# Patient Record
Sex: FEMALE | Race: WHITE | NOT HISPANIC OR LATINO | URBAN - METROPOLITAN AREA
[De-identification: names, ages, dates, MRNs, and addresses within clinical notes are randomized per-mention and may not be internally consistent; named-entity substitution may affect disease eponyms.]

---

## 2017-11-15 ENCOUNTER — EMERGENCY (EMERGENCY)
Facility: HOSPITAL | Age: 82
LOS: 1 days | Discharge: HOME | End: 2017-11-15
Attending: EMERGENCY MEDICINE
Payer: MEDICARE

## 2017-11-15 VITALS
HEART RATE: 79 BPM | SYSTOLIC BLOOD PRESSURE: 145 MMHG | DIASTOLIC BLOOD PRESSURE: 101 MMHG | OXYGEN SATURATION: 98 % | RESPIRATION RATE: 17 BRPM | WEIGHT: 130.07 LBS | TEMPERATURE: 98 F

## 2017-11-15 DIAGNOSIS — R11.2 NAUSEA WITH VOMITING, UNSPECIFIED: ICD-10-CM

## 2017-11-15 DIAGNOSIS — F03.90 UNSPECIFIED DEMENTIA WITHOUT BEHAVIORAL DISTURBANCE: ICD-10-CM

## 2017-11-15 DIAGNOSIS — E78.5 HYPERLIPIDEMIA, UNSPECIFIED: ICD-10-CM

## 2017-11-15 DIAGNOSIS — I10 ESSENTIAL (PRIMARY) HYPERTENSION: ICD-10-CM

## 2017-11-15 LAB
ALBUMIN SERPL ELPH-MCNC: 3.5 G/DL — SIGNIFICANT CHANGE UP (ref 3.5–5)
ALP SERPL-CCNC: 45 U/L — SIGNIFICANT CHANGE UP (ref 40–120)
ALT FLD-CCNC: 47 U/L DA — SIGNIFICANT CHANGE UP (ref 10–60)
ANION GAP SERPL CALC-SCNC: 11 MMOL/L — SIGNIFICANT CHANGE UP (ref 5–17)
APPEARANCE UR: CLEAR — SIGNIFICANT CHANGE UP
APTT BLD: 23.3 SEC — LOW (ref 27.5–37.4)
AST SERPL-CCNC: 38 U/L — SIGNIFICANT CHANGE UP (ref 10–40)
BASOPHILS # BLD AUTO: 0.1 K/UL — SIGNIFICANT CHANGE UP (ref 0–0.2)
BASOPHILS NFR BLD AUTO: 1.2 % — SIGNIFICANT CHANGE UP (ref 0–2)
BILIRUB SERPL-MCNC: 0.3 MG/DL — SIGNIFICANT CHANGE UP (ref 0.2–1.2)
BILIRUB UR-MCNC: NEGATIVE — SIGNIFICANT CHANGE UP
BUN SERPL-MCNC: 17 MG/DL — SIGNIFICANT CHANGE UP (ref 7–18)
CALCIUM SERPL-MCNC: 8.9 MG/DL — SIGNIFICANT CHANGE UP (ref 8.4–10.5)
CHLORIDE SERPL-SCNC: 101 MMOL/L — SIGNIFICANT CHANGE UP (ref 96–108)
CK MB CFR SERPL CALC: 1.1 NG/ML — SIGNIFICANT CHANGE UP (ref 0–3.6)
CO2 SERPL-SCNC: 25 MMOL/L — SIGNIFICANT CHANGE UP (ref 22–31)
COLOR SPEC: YELLOW — SIGNIFICANT CHANGE UP
CREAT SERPL-MCNC: 0.78 MG/DL — SIGNIFICANT CHANGE UP (ref 0.5–1.3)
DIFF PNL FLD: NEGATIVE — SIGNIFICANT CHANGE UP
EOSINOPHIL # BLD AUTO: 0.2 K/UL — SIGNIFICANT CHANGE UP (ref 0–0.5)
EOSINOPHIL NFR BLD AUTO: 2.8 % — SIGNIFICANT CHANGE UP (ref 0–6)
GLUCOSE SERPL-MCNC: 194 MG/DL — HIGH (ref 70–99)
GLUCOSE UR QL: NEGATIVE — SIGNIFICANT CHANGE UP
HCT VFR BLD CALC: 36.3 % — SIGNIFICANT CHANGE UP (ref 34.5–45)
HGB BLD-MCNC: 12 G/DL — SIGNIFICANT CHANGE UP (ref 11.5–15.5)
INR BLD: 1.01 RATIO — SIGNIFICANT CHANGE UP (ref 0.88–1.16)
KETONES UR-MCNC: ABNORMAL
LACTATE SERPL-SCNC: 3.5 MMOL/L — HIGH (ref 0.7–2)
LEUKOCYTE ESTERASE UR-ACNC: NEGATIVE — SIGNIFICANT CHANGE UP
LIDOCAIN IGE QN: 284 U/L — SIGNIFICANT CHANGE UP (ref 73–393)
LYMPHOCYTES # BLD AUTO: 1.4 K/UL — SIGNIFICANT CHANGE UP (ref 1–3.3)
LYMPHOCYTES # BLD AUTO: 21.1 % — SIGNIFICANT CHANGE UP (ref 13–44)
MAGNESIUM SERPL-MCNC: 1.8 MG/DL — SIGNIFICANT CHANGE UP (ref 1.6–2.6)
MCHC RBC-ENTMCNC: 30.1 PG — SIGNIFICANT CHANGE UP (ref 27–34)
MCHC RBC-ENTMCNC: 33.2 GM/DL — SIGNIFICANT CHANGE UP (ref 32–36)
MCV RBC AUTO: 90.8 FL — SIGNIFICANT CHANGE UP (ref 80–100)
MONOCYTES # BLD AUTO: 0.5 K/UL — SIGNIFICANT CHANGE UP (ref 0–0.9)
MONOCYTES NFR BLD AUTO: 7.8 % — SIGNIFICANT CHANGE UP (ref 2–14)
NEUTROPHILS # BLD AUTO: 4.3 K/UL — SIGNIFICANT CHANGE UP (ref 1.8–7.4)
NEUTROPHILS NFR BLD AUTO: 67.1 % — SIGNIFICANT CHANGE UP (ref 43–77)
NITRITE UR-MCNC: NEGATIVE — SIGNIFICANT CHANGE UP
NT-PROBNP SERPL-SCNC: 204 PG/ML — SIGNIFICANT CHANGE UP (ref 0–450)
PH UR: 9 — HIGH (ref 5–8)
PLATELET # BLD AUTO: 185 K/UL — SIGNIFICANT CHANGE UP (ref 150–400)
POTASSIUM SERPL-MCNC: 4 MMOL/L — SIGNIFICANT CHANGE UP (ref 3.5–5.3)
POTASSIUM SERPL-SCNC: 4 MMOL/L — SIGNIFICANT CHANGE UP (ref 3.5–5.3)
PROT SERPL-MCNC: 7.4 G/DL — SIGNIFICANT CHANGE UP (ref 6–8.3)
PROT UR-MCNC: 15
PROTHROM AB SERPL-ACNC: 11 SEC — SIGNIFICANT CHANGE UP (ref 9.8–12.7)
RBC # BLD: 4 M/UL — SIGNIFICANT CHANGE UP (ref 3.8–5.2)
RBC # FLD: 12.2 % — SIGNIFICANT CHANGE UP (ref 10.3–14.5)
SODIUM SERPL-SCNC: 137 MMOL/L — SIGNIFICANT CHANGE UP (ref 135–145)
SP GR SPEC: 1.01 — SIGNIFICANT CHANGE UP (ref 1.01–1.02)
TROPONIN I SERPL-MCNC: <0.015 NG/ML — SIGNIFICANT CHANGE UP (ref 0–0.04)
UROBILINOGEN FLD QL: NEGATIVE — SIGNIFICANT CHANGE UP
WBC # BLD: 6.5 K/UL — SIGNIFICANT CHANGE UP (ref 3.8–10.5)
WBC # FLD AUTO: 6.5 K/UL — SIGNIFICANT CHANGE UP (ref 3.8–10.5)

## 2017-11-15 PROCEDURE — 82962 GLUCOSE BLOOD TEST: CPT

## 2017-11-15 PROCEDURE — 70450 CT HEAD/BRAIN W/O DYE: CPT

## 2017-11-15 PROCEDURE — 82553 CREATINE MB FRACTION: CPT

## 2017-11-15 PROCEDURE — 84484 ASSAY OF TROPONIN QUANT: CPT

## 2017-11-15 PROCEDURE — 71045 X-RAY EXAM CHEST 1 VIEW: CPT

## 2017-11-15 PROCEDURE — 71260 CT THORAX DX C+: CPT

## 2017-11-15 PROCEDURE — 70450 CT HEAD/BRAIN W/O DYE: CPT | Mod: 26

## 2017-11-15 PROCEDURE — 87040 BLOOD CULTURE FOR BACTERIA: CPT

## 2017-11-15 PROCEDURE — 71010: CPT | Mod: 26

## 2017-11-15 PROCEDURE — 85610 PROTHROMBIN TIME: CPT

## 2017-11-15 PROCEDURE — 99284 EMERGENCY DEPT VISIT MOD MDM: CPT | Mod: 25

## 2017-11-15 PROCEDURE — 71260 CT THORAX DX C+: CPT | Mod: 26

## 2017-11-15 PROCEDURE — 85027 COMPLETE CBC AUTOMATED: CPT

## 2017-11-15 PROCEDURE — 96374 THER/PROPH/DIAG INJ IV PUSH: CPT

## 2017-11-15 PROCEDURE — 74177 CT ABD & PELVIS W/CONTRAST: CPT

## 2017-11-15 PROCEDURE — 99284 EMERGENCY DEPT VISIT MOD MDM: CPT

## 2017-11-15 PROCEDURE — 83735 ASSAY OF MAGNESIUM: CPT

## 2017-11-15 PROCEDURE — 80053 COMPREHEN METABOLIC PANEL: CPT

## 2017-11-15 PROCEDURE — 81001 URINALYSIS AUTO W/SCOPE: CPT

## 2017-11-15 PROCEDURE — 83690 ASSAY OF LIPASE: CPT

## 2017-11-15 PROCEDURE — 83605 ASSAY OF LACTIC ACID: CPT

## 2017-11-15 PROCEDURE — 83880 ASSAY OF NATRIURETIC PEPTIDE: CPT

## 2017-11-15 PROCEDURE — 87086 URINE CULTURE/COLONY COUNT: CPT

## 2017-11-15 PROCEDURE — 85730 THROMBOPLASTIN TIME PARTIAL: CPT

## 2017-11-15 PROCEDURE — 74177 CT ABD & PELVIS W/CONTRAST: CPT | Mod: 26

## 2017-11-15 RX ORDER — MIDAZOLAM HYDROCHLORIDE 1 MG/ML
2 INJECTION, SOLUTION INTRAMUSCULAR; INTRAVENOUS ONCE
Refills: 0 | Status: DISCONTINUED | OUTPATIENT
Start: 2017-11-15 | End: 2017-11-15

## 2017-11-15 RX ORDER — ONDANSETRON 8 MG/1
5 TABLET, FILM COATED ORAL
Qty: 75 | Refills: 0
Start: 2017-11-15 | End: 2017-11-20

## 2017-11-15 RX ORDER — SODIUM CHLORIDE 9 MG/ML
1000 INJECTION INTRAMUSCULAR; INTRAVENOUS; SUBCUTANEOUS ONCE
Refills: 0 | Status: COMPLETED | OUTPATIENT
Start: 2017-11-15 | End: 2017-11-15

## 2017-11-15 RX ORDER — ONDANSETRON 8 MG/1
4 TABLET, FILM COATED ORAL ONCE
Refills: 0 | Status: COMPLETED | OUTPATIENT
Start: 2017-11-15 | End: 2017-11-15

## 2017-11-15 RX ORDER — HALOPERIDOL DECANOATE 100 MG/ML
5 INJECTION INTRAMUSCULAR ONCE
Refills: 0 | Status: COMPLETED | OUTPATIENT
Start: 2017-11-15 | End: 2017-11-15

## 2017-11-15 RX ADMIN — ONDANSETRON 4 MILLIGRAM(S): 8 TABLET, FILM COATED ORAL at 18:52

## 2017-11-15 RX ADMIN — SODIUM CHLORIDE 1000 MILLILITER(S): 9 INJECTION INTRAMUSCULAR; INTRAVENOUS; SUBCUTANEOUS at 18:53

## 2017-11-15 NOTE — ED ADULT NURSE NOTE - OBJECTIVE STATEMENT
patient came to the ED for vomiting and weakness. Patient is alert and confused has poor appetite, agitated during care.

## 2017-11-15 NOTE — ED PROVIDER NOTE - PHYSICAL EXAMINATION
Gen: somnolent but arousable. looks anxious.   Head: NC, AT   Eyes: PERRL, EOMI, normal lids/conjunctiva  ENT: normal hearing, patent oropharynx without erythema/exudate, uvula midline  Neck: supple, no tenderness, Trachea midline  Pulm: Bilateral BS, normal resp effort, no wheeze/stridor/retractions  CV: RRR, no M/R/G, 2+ radial and dp pulses bl, no edema  Abd: soft, NT/ND, +BS, no hepatosplenomegaly  Mskel: extremities x4 with normal ROM and no joint effusions. no ctl spine ttp.   Skin: no rash, no bruising   Neuro: patient seems to know her daughter, but does not understand where, when or what is happening now. no sensory/motor deficits, CN 2-12 intact

## 2017-11-16 LAB
CULTURE RESULTS: NO GROWTH — SIGNIFICANT CHANGE UP
SPECIMEN SOURCE: SIGNIFICANT CHANGE UP

## 2017-11-21 LAB
CULTURE RESULTS: SIGNIFICANT CHANGE UP
CULTURE RESULTS: SIGNIFICANT CHANGE UP
SPECIMEN SOURCE: SIGNIFICANT CHANGE UP
SPECIMEN SOURCE: SIGNIFICANT CHANGE UP

## 2018-06-04 ENCOUNTER — INPATIENT (INPATIENT)
Facility: HOSPITAL | Age: 83
LOS: 0 days | Discharge: OTHER INSTITUTION | End: 2018-06-05
Attending: INTERNAL MEDICINE | Admitting: INTERNAL MEDICINE
Payer: MEDICARE

## 2018-06-04 VITALS
SYSTOLIC BLOOD PRESSURE: 136 MMHG | DIASTOLIC BLOOD PRESSURE: 82 MMHG | RESPIRATION RATE: 18 BRPM | TEMPERATURE: 98 F | OXYGEN SATURATION: 96 % | HEART RATE: 93 BPM

## 2018-06-04 DIAGNOSIS — G30.8 OTHER ALZHEIMER'S DISEASE: ICD-10-CM

## 2018-06-04 DIAGNOSIS — R62.7 ADULT FAILURE TO THRIVE: ICD-10-CM

## 2018-06-04 DIAGNOSIS — R19.7 DIARRHEA, UNSPECIFIED: ICD-10-CM

## 2018-06-04 DIAGNOSIS — I10 ESSENTIAL (PRIMARY) HYPERTENSION: ICD-10-CM

## 2018-06-04 DIAGNOSIS — R53.2 FUNCTIONAL QUADRIPLEGIA: ICD-10-CM

## 2018-06-04 DIAGNOSIS — Z29.9 ENCOUNTER FOR PROPHYLACTIC MEASURES, UNSPECIFIED: ICD-10-CM

## 2018-06-04 DIAGNOSIS — E11.9 TYPE 2 DIABETES MELLITUS WITHOUT COMPLICATIONS: ICD-10-CM

## 2018-06-04 DIAGNOSIS — Z71.89 OTHER SPECIFIED COUNSELING: ICD-10-CM

## 2018-06-04 LAB
ALBUMIN SERPL ELPH-MCNC: 3.2 G/DL — LOW (ref 3.3–5)
ALP SERPL-CCNC: 46 U/L — SIGNIFICANT CHANGE UP (ref 40–120)
ALT FLD-CCNC: 28 U/L — SIGNIFICANT CHANGE UP (ref 4–33)
APPEARANCE UR: CLEAR — SIGNIFICANT CHANGE UP
AST SERPL-CCNC: 37 U/L — HIGH (ref 4–32)
BASE EXCESS BLDV CALC-SCNC: 2.7 MMOL/L — SIGNIFICANT CHANGE UP
BASOPHILS # BLD AUTO: 0.03 K/UL — SIGNIFICANT CHANGE UP (ref 0–0.2)
BASOPHILS NFR BLD AUTO: 0.3 % — SIGNIFICANT CHANGE UP (ref 0–2)
BILIRUB SERPL-MCNC: 0.3 MG/DL — SIGNIFICANT CHANGE UP (ref 0.2–1.2)
BILIRUB UR-MCNC: NEGATIVE — SIGNIFICANT CHANGE UP
BLOOD GAS VENOUS - CREATININE: 0.49 MG/DL — LOW (ref 0.5–1.3)
BLOOD UR QL VISUAL: NEGATIVE — SIGNIFICANT CHANGE UP
BUN SERPL-MCNC: 16 MG/DL — SIGNIFICANT CHANGE UP (ref 7–23)
CALCIUM SERPL-MCNC: 11 MG/DL — HIGH (ref 8.4–10.5)
CHLORIDE BLDV-SCNC: 100 MMOL/L — SIGNIFICANT CHANGE UP (ref 96–108)
CHLORIDE SERPL-SCNC: 93 MMOL/L — LOW (ref 98–107)
CO2 SERPL-SCNC: 25 MMOL/L — SIGNIFICANT CHANGE UP (ref 22–31)
COLOR SPEC: YELLOW — SIGNIFICANT CHANGE UP
CREAT SERPL-MCNC: 0.58 MG/DL — SIGNIFICANT CHANGE UP (ref 0.5–1.3)
EOSINOPHIL # BLD AUTO: 0.09 K/UL — SIGNIFICANT CHANGE UP (ref 0–0.5)
EOSINOPHIL NFR BLD AUTO: 0.9 % — SIGNIFICANT CHANGE UP (ref 0–6)
GAS PNL BLDV: 129 MMOL/L — LOW (ref 136–146)
GLUCOSE BLDV-MCNC: 168 — HIGH (ref 70–99)
GLUCOSE SERPL-MCNC: 172 MG/DL — HIGH (ref 70–99)
GLUCOSE UR-MCNC: NEGATIVE — SIGNIFICANT CHANGE UP
HCO3 BLDV-SCNC: 26 MMOL/L — SIGNIFICANT CHANGE UP (ref 20–27)
HCT VFR BLD CALC: 33.2 % — LOW (ref 34.5–45)
HCT VFR BLDV CALC: 35.6 % — SIGNIFICANT CHANGE UP (ref 34.5–45)
HGB BLD-MCNC: 10.9 G/DL — LOW (ref 11.5–15.5)
HGB BLDV-MCNC: 11.6 G/DL — SIGNIFICANT CHANGE UP (ref 11.5–15.5)
IMM GRANULOCYTES # BLD AUTO: 0.06 # — SIGNIFICANT CHANGE UP
IMM GRANULOCYTES NFR BLD AUTO: 0.6 % — SIGNIFICANT CHANGE UP (ref 0–1.5)
KETONES UR-MCNC: NEGATIVE — SIGNIFICANT CHANGE UP
LACTATE BLDV-MCNC: 2.8 MMOL/L — HIGH (ref 0.5–2)
LEUKOCYTE ESTERASE UR-ACNC: NEGATIVE — SIGNIFICANT CHANGE UP
LYMPHOCYTES # BLD AUTO: 0.82 K/UL — LOW (ref 1–3.3)
LYMPHOCYTES # BLD AUTO: 7.9 % — LOW (ref 13–44)
MCHC RBC-ENTMCNC: 27.5 PG — SIGNIFICANT CHANGE UP (ref 27–34)
MCHC RBC-ENTMCNC: 32.8 % — SIGNIFICANT CHANGE UP (ref 32–36)
MCV RBC AUTO: 83.8 FL — SIGNIFICANT CHANGE UP (ref 80–100)
MONOCYTES # BLD AUTO: 0.77 K/UL — SIGNIFICANT CHANGE UP (ref 0–0.9)
MONOCYTES NFR BLD AUTO: 7.4 % — SIGNIFICANT CHANGE UP (ref 2–14)
MUCOUS THREADS # UR AUTO: SIGNIFICANT CHANGE UP
NEUTROPHILS # BLD AUTO: 8.59 K/UL — HIGH (ref 1.8–7.4)
NEUTROPHILS NFR BLD AUTO: 82.9 % — HIGH (ref 43–77)
NITRITE UR-MCNC: NEGATIVE — SIGNIFICANT CHANGE UP
NRBC # FLD: 0 — SIGNIFICANT CHANGE UP
PCO2 BLDV: 46 MMHG — SIGNIFICANT CHANGE UP (ref 41–51)
PH BLDV: 7.39 PH — SIGNIFICANT CHANGE UP (ref 7.32–7.43)
PH UR: 6 — SIGNIFICANT CHANGE UP (ref 4.6–8)
PLATELET # BLD AUTO: 408 K/UL — HIGH (ref 150–400)
PMV BLD: 9.9 FL — SIGNIFICANT CHANGE UP (ref 7–13)
PO2 BLDV: 30 MMHG — LOW (ref 35–40)
POTASSIUM BLDV-SCNC: 4 MMOL/L — SIGNIFICANT CHANGE UP (ref 3.4–4.5)
POTASSIUM SERPL-MCNC: 4.3 MMOL/L — SIGNIFICANT CHANGE UP (ref 3.5–5.3)
POTASSIUM SERPL-SCNC: 4.3 MMOL/L — SIGNIFICANT CHANGE UP (ref 3.5–5.3)
PROT SERPL-MCNC: 6.7 G/DL — SIGNIFICANT CHANGE UP (ref 6–8.3)
PROT UR-MCNC: 10 MG/DL — SIGNIFICANT CHANGE UP
RBC # BLD: 3.96 M/UL — SIGNIFICANT CHANGE UP (ref 3.8–5.2)
RBC # FLD: 12.7 % — SIGNIFICANT CHANGE UP (ref 10.3–14.5)
RBC CASTS # UR COMP ASSIST: SIGNIFICANT CHANGE UP (ref 0–?)
SAO2 % BLDV: 49.9 % — LOW (ref 60–85)
SODIUM SERPL-SCNC: 133 MMOL/L — LOW (ref 135–145)
SP GR SPEC: 1.02 — SIGNIFICANT CHANGE UP (ref 1–1.04)
UROBILINOGEN FLD QL: NORMAL MG/DL — SIGNIFICANT CHANGE UP
WBC # BLD: 10.36 K/UL — SIGNIFICANT CHANGE UP (ref 3.8–10.5)
WBC # FLD AUTO: 10.36 K/UL — SIGNIFICANT CHANGE UP (ref 3.8–10.5)
WBC UR QL: SIGNIFICANT CHANGE UP (ref 0–?)

## 2018-06-04 PROCEDURE — 71045 X-RAY EXAM CHEST 1 VIEW: CPT | Mod: 26

## 2018-06-04 PROCEDURE — 99223 1ST HOSP IP/OBS HIGH 75: CPT

## 2018-06-04 PROCEDURE — 99497 ADVNCD CARE PLAN 30 MIN: CPT | Mod: 25

## 2018-06-04 RX ORDER — ASPIRIN/CALCIUM CARB/MAGNESIUM 324 MG
81 TABLET ORAL DAILY
Refills: 0 | Status: DISCONTINUED | OUTPATIENT
Start: 2018-06-04 | End: 2018-06-04

## 2018-06-04 RX ORDER — LOSARTAN POTASSIUM 100 MG/1
50 TABLET, FILM COATED ORAL DAILY
Refills: 0 | Status: DISCONTINUED | OUTPATIENT
Start: 2018-06-04 | End: 2018-06-04

## 2018-06-04 RX ORDER — HEPARIN SODIUM 5000 [USP'U]/ML
5000 INJECTION INTRAVENOUS; SUBCUTANEOUS EVERY 12 HOURS
Refills: 0 | Status: DISCONTINUED | OUTPATIENT
Start: 2018-06-04 | End: 2018-06-04

## 2018-06-04 RX ORDER — SODIUM CHLORIDE 9 MG/ML
1000 INJECTION INTRAMUSCULAR; INTRAVENOUS; SUBCUTANEOUS ONCE
Refills: 0 | Status: COMPLETED | OUTPATIENT
Start: 2018-06-04 | End: 2018-06-04

## 2018-06-04 RX ORDER — SODIUM CHLORIDE 9 MG/ML
1000 INJECTION INTRAMUSCULAR; INTRAVENOUS; SUBCUTANEOUS
Refills: 0 | Status: COMPLETED | OUTPATIENT
Start: 2018-06-04 | End: 2018-06-04

## 2018-06-04 RX ORDER — ATORVASTATIN CALCIUM 80 MG/1
10 TABLET, FILM COATED ORAL AT BEDTIME
Refills: 0 | Status: DISCONTINUED | OUTPATIENT
Start: 2018-06-04 | End: 2018-06-04

## 2018-06-04 RX ADMIN — SODIUM CHLORIDE 500 MILLILITER(S): 9 INJECTION INTRAMUSCULAR; INTRAVENOUS; SUBCUTANEOUS at 13:21

## 2018-06-04 RX ADMIN — SODIUM CHLORIDE 100 MILLILITER(S): 9 INJECTION INTRAMUSCULAR; INTRAVENOUS; SUBCUTANEOUS at 17:16

## 2018-06-04 NOTE — ED PROVIDER NOTE - PHYSICAL EXAMINATION
General: cachectic appearing.   Neurology: A&Ox0, nonfocal  Eyes: PERRLA/ EOMI  ENT/Neck: Neck supple, trachea midline, No JVD, Gross hearing intact  Respiratory: CTA B/L, No wheezing, rales, rhonchi  CV: RRR, +S1/S2, -S3/S4, no murmurs, rubs or gallops  Abdominal: Soft, ND +BS, No HSM  MSK: strength unable to be assessed 2/2 clinical condition  Extremities: No edema, 2+ peripheral pulses  Skin: No Rashes, Hematoma, Ecchymosis

## 2018-06-04 NOTE — CONSULT NOTE ADULT - PROBLEM SELECTOR RECOMMENDATION 2
she has been declining over the last several years and now has difficulty swallowing. Daughters do not want any feeding tube and understands the natural course.

## 2018-06-04 NOTE — ED PROVIDER NOTE - OBJECTIVE STATEMENT
85F presents to the ED c/o ..... 85F non-verbal, bedbound, PMH DMT2, Advanced Alzheimer's Dementia presents to the ED due to acute on chronic mental status deterioration and inability to take solid or liquid po. Patient has been diagnosed with alzheimer's officialy since 10/2017 and since then has been minimally responsive when she is awake, and is unable to make her needs known. She is fully dependent upon all ADL's and iADLs. She has been receiving 12-24/7 care with her family. Her daughter is her HCP and the patient is DNR/DNI. The family values her quality of life and her comfort "above all else". The patient began demonstrating diarrhea and increased somnolence over the last 2 days, which is why she was brought to the ED for medication attention. I personally discussed hospice with the family, and they are quite receptive.

## 2018-06-04 NOTE — ED PROVIDER NOTE - CARE PLAN
Principal Discharge DX:	Failure to thrive in adult  Secondary Diagnosis:	Alzheimer's disease of other onset without behavioral disturbance

## 2018-06-04 NOTE — CONSULT NOTE ADULT - SUBJECTIVE AND OBJECTIVE BOX
HPI:      PERTINENT PMH REVIEWED:  [ ] YES [ ] NO           SOCIAL HISTORY:   Significant other/partner:  [ ] YES  [ ] NO               Children:  [ ] YES  [ ] NO                   Congregation/Spirituality:  Substance hx:  [ ] YES   [ ] NO                   Tobacco hx:  [ ] YES  [ ] NO                       Alcohol hx: [ ] YES  [ ] NO         Home Opioid hx:  [ ] YES  [ ] NO   Living Situation: [ ] Home  [ ] Long term care  [ ] Rehab [ ] Other    FAMILY HISTORY:  No pertinent family history in first degree relatives    [ ] Family history non-contributory     BASELINE (I)ADLs (prior to admission):  [place]: [ ] total  [ ] moderate [ ] dependent    ADVANCE DIRECTIVES:    DNR [ ] YES [ ] NO                            [ ] Completed  MOLST  [ ] YES [ ] NO                      [ ] Completed  Health Care Proxy [ ] YES  [ ] NO   [ ] Completed  Living Will  [ ] YES [ ] NO             [ ] Surrogate  [ ] HCP  [ ] Guardian:                                                                  Phone#:    Allergies    No Known Allergies    Intolerances        MEDICATIONS  (STANDING):    MEDICATIONS  (PRN):      PRESENT SYMPTOMS:  Source: [ ] Patient   [ ] Family   [ ] Team     Pain:                        [ ] No [ ] Yes             [ ] Mild [ ] Moderate [ ] Severe    Onset -  Location -  Duration -  Character -  Alleviating/Aggravating -  Radiation -  Timing -      Dyspnea:                [ ] No [ ] Yes             [ ] Mild [ ] Moderate [ ] Severe    Anxiety:                  [ ] No [ ] Yes             [ ] Mild [ ] Moderate [ ] Severe    Fatigue:                  [ ] No [ ] Yes             [ ] Mild [ ] Moderate [ ] Severe    Nausea:                  [ ] No [ ] Yes             [ ] Mild [ ] Moderate [ ] Severe    Loss of appetite:   [ ] No [ ] Yes             [ ] Mild [ ] Moderate [ ] Severe    Constipation:        [ ] No [ ] Yes             [ ] Mild [ ] Moderate [ ] Severe    Other Symptoms:  [ ] All other review of systems negative   [ ] Unable to obtain due to poor mentation     Karnofsky Performance Score/Palliative Performance Status Version 2:         %    PHYSICAL EXAM:  Vital Signs Last 24 Hrs  T(C): 37.2 (04 Jun 2018 12:05), Max: 37.2 (04 Jun 2018 12:05)  T(F): 99 (04 Jun 2018 12:05), Max: 99 (04 Jun 2018 12:05)  HR: 90 (04 Jun 2018 12:05) (90 - 93)  BP: 147/59 (04 Jun 2018 12:05) (136/82 - 147/59)  BP(mean): --  RR: 16 (04 Jun 2018 12:05) (16 - 18)  SpO2: 99% (04 Jun 2018 12:05) (96% - 99%) I&O's Summary      General:  [ ] Alert  [ ] Oriented x      [ ] Lethargic  [ ] Agitated   [ ] Cachexia   [ ] Unarousable  [ ] Verbal  [ ] Non-Verbal    HEENT:  [ ] Normal   [ ] Dry mouth   [ ] ET Tube    [ ] Trach  [ ] Oral lesions    Lungs:   [ ] Clear [ ] Tachypnea  [ ] Audible excessive secretions   [ ] Rhonchi        [ ] Right [ ] Left [ ] Bilateral  [ ] Crackles        [ ] Right [ ] Left [ ] Bilateral  [ ] Wheezing     [ ] Right [ ] Left [ ] Bilateral    Cardiovascular:  [ ] Regular [ ] Irregular [ ] Tachycardia   [ ] Bradycardia  [ ] Murmur [ ] Other    Abdomen: [ ] Soft  [ ] Distended   [ ] +BS  [ ] Non tender [ ] Tender  [ ]PEG   [ ]OGT/ NGT   Last BM:       Genitourinary: [ ] Normal [ ] Incontinent   [ ] Oliguria/Anuria   [ ] [place]    Musculoskeletal:  [ ] Normal   [ ] Weakness  [ ] Bedbound/Wheelchair bound [ ] Edema    Neurological: [ ] No focal deficits  [ ] Cognitive impairment  [ ] Dysphagia [ ] Dysarthria [ ] Paresis [ ] Other     Skin: [ ] Normal   [ ] Pressure ulcer(s)                  [ ] Rash    LABS:                        10.9   10.36 )-----------( 408      ( 04 Jun 2018 11:44 )             33.2     06-04    133<L>  |  93<L>  |  16  ----------------------------<  172<H>  4.3   |  25  |  0.58    Ca    11.0<H>      04 Jun 2018 11:44    TPro  6.7  /  Alb  3.2<L>  /  TBili  0.3  /  DBili  x   /  AST  37<H>  /  ALT  28  /  AlkPhos  46  06-04          Shock: [ ] Septic [ ] Cardiogenic [ ] Neurologic [ ] Hypovolemic  Vasopressors x   Inotrophs x     Protein Calorie Malnutrition: [ ] Mild [ ] Moderate [ ] Severe    Oral Intake: [ ] Unable/mouth care only [ ] Minimal [ ] Moderate [ ] Full Capability  Diet: [ ] NPO [ ] Tube feeds [ ] TPN [ ] Other     RADIOLOGY & ADDITIONAL STUDIES:    REFERRALS:   [ ] Chaplaincy  [ ] Hospice  [ ] Child Life  [ ] Social Work  [ ] Case management [ ] Holistic Therapy HPI: 85 year old non-verbal, non-communicative, bedbound, woman from home with 24 hours HHA and PMH of DMT2, Advanced Alzheimer's Dementia is admitted for acute on chronic mental status deterioration and inability to eat. She is diagnosed with Alzheimer’s since 10/2017 and since has been minimally responsive. Her daughter Don Howe is her HCP and mentioned that she is DNR/DNI. Daughters are concern about the quality of life and wants her to be comfortable. Patient developed diarrhea and got increasing somnolent over the last 2 days.        PERTINENT PMH REVIEWED:  [X ] YES [ ] NO        Alzheimer's disease of other onset without behavioral disturbance    Type 2 diabetes mellitus with complication, without long-term current use of insulin.       SOCIAL HISTORY:   Significant other/partner:  [X ] YES  [ ] NO               Children:  [X ] YES  [ ] NO                   Taoist/Spirituality: Temple  Substance hx:  [ ] YES   [X ] NO                   Tobacco hx:  [X ] YES  [ ] NO  SOCIAL                     Alcohol hx: [X ] YES  [ ] NO, SOCIAL         Home Opioid hx:  [ ] YES  [X ] NO   Living Situation: [ X] Home  [ ] Long term care  [ ] Rehab [ ] Other    FAMILY HISTORY:  No pertinent family history in first degree relatives    [X ] Family history non-contributory     BASELINE (I)ADLs (prior to admission):  [place]: [ ] total  [ ] moderate [X ] dependent    ADVANCE DIRECTIVES:    DNR [X ] YES [ ] NO                            [ ] Completed  MOLST  [ ] YES [X ] NO                      [ ] Completed  Health Care Proxy [X ] YES  [ ] NO   [ ] Completed  Living Will  [ ] YES [ ] NO             [ ] Surrogate  [X ] HCP  [ ] Guardian: DON HOWE                                                                  Phone#: [phone]    PMD Katlin Zurita at [facility]     Allergies    No Known Allergies    Intolerances        MEDICATIONS  (STANDING):    MEDICATIONS  (PRN):      PRESENT SYMPTOMS:  Source: [ ] Patient   [ X] Family   [X ] Team     Pain:                        [ ] No [X ] Yes             [ ] Mild [X ] Moderate [ ] Severe- Moaning and cry.    Onset -  Location -  Duration -  Character -  Alleviating/Aggravating -  Radiation -  Timing -      Dyspnea:                [ ] No [ ] Yes             [ ] Mild [ ] Moderate [ ] Severe    Anxiety:                  [ ] No [ ] Yes             [ ] Mild [ ] Moderate [ ] Severe    Fatigue:                  [ ] No [ ] Yes             [ ] Mild [ ] Moderate [ ] Severe    Nausea:                  [ ] No [ ] Yes             [ ] Mild [ ] Moderate [ ] Severe    Loss of appetite:   [ ] No [ ] Yes             [ ] Mild [ ] Moderate [ ] Severe    Constipation:        [ ] No [ ] Yes             [ ] Mild [ ] Moderate [ ] Severe    Other Symptoms:  [ ] All other review of systems negative   [X ] Unable to obtain due to poor mentation     Karnofsky Performance Score/Palliative Performance Status Version 2:   20      %    PHYSICAL EXAM:  Vital Signs Last 24 Hrs  T(C): 37.2 (04 Jun 2018 12:05), Max: 37.2 (04 Jun 2018 12:05)  T(F): 99 (04 Jun 2018 12:05), Max: 99 (04 Jun 2018 12:05)  HR: 90 (04 Jun 2018 12:05) (90 - 93)  BP: 147/59 (04 Jun 2018 12:05) (136/82 - 147/59)  BP(mean): --  RR: 16 (04 Jun 2018 12:05) (16 - 18)  SpO2: 99% (04 Jun 2018 12:05) (96% - 99%) I&O's Summary      General:  [ ] Alert  [ ] Oriented x      [X ] Lethargic  [ ] Agitated   [ ] Cachexia   [ ] Unarousable  [ ] Verbal  [X ] Non-Verbal    HEENT:  [ ] Normal   [X ] Dry mouth   [ ] ET Tube    [ ] Trach  [ ] Oral lesions    Lungs:   [X ] Clear [ ] Tachypnea  [ ] Audible excessive secretions   [ ] Rhonchi        [ ] Right [ ] Left [ ] Bilateral  [ ] Crackles        [ ] Right [ ] Left [ ] Bilateral  [ ] Wheezing     [ ] Right [ ] Left [ ] Bilateral    Cardiovascular:  [X ] Regular [ ] Irregular [ ] Tachycardia   [ ] Bradycardia  [ ] Murmur [ ] Other    Abdomen: [X ] Soft  [X ] Distended   [ X] +BS  [X ] Non tender [ ] Tender  [ ]PEG   [ ]OGT/ NGT   Last BM:  today     Genitourinary: [ ] Normal [X ] Incontinent   [ ] Oliguria/Anuria   [ ] [place]    Musculoskeletal:  [ ] Normal   [ ] Weakness  [X ] Bedbound/Wheelchair bound [ ] Edema    Neurological: [ ] No focal deficits  [X ] Cognitive impairment  [X ] Dysphagia [X ] Dysarthria [X ] Paresis [ ] Other     Skin: [ X] Normal   [ ] Pressure ulcer(s)                  [ ] Rash    LABS:                        10.9   10.36 )-----------( 408      ( 04 Jun 2018 11:44 )             33.2     06-04    133<L>  |  93<L>  |  16  ----------------------------<  172<H>  4.3   |  25  |  0.58    Ca    11.0<H>      04 Jun 2018 11:44    TPro  6.7  /  Alb  3.2<L>  /  TBili  0.3  /  DBili  x   /  AST  37<H>  /  ALT  28  /  AlkPhos  46  06-04      Shock: [ ] Septic [ ] Cardiogenic [ ] Neurologic [ ] Hypovolemic  Vasopressors x   Inotrophs x     Protein Calorie Malnutrition: [X ] Mild [ ] Moderate [ ] Severe    Oral Intake: [X ] Unable/mouth care only [ ] Minimal [ ] Moderate [ ] Full Capability  Diet: [ ] NPO [ ] Tube feeds [ ] TPN [ ] Other     RADIOLOGY & ADDITIONAL STUDIES:    REFERRALS:   [ ] Chaplaincy  [X ] Hospice  [ ] Child Life  [ ] Social Work  [ ] Case management [ ] Holistic Therapy

## 2018-06-04 NOTE — ED PROVIDER NOTE - PMH
Alzheimer's disease of other onset without behavioral disturbance    Type 2 diabetes mellitus with complication, without long-term current use of insulin

## 2018-06-04 NOTE — ED ADULT NURSE NOTE - OBJECTIVE STATEMENT
Received pt into spot #[id number]. Pt appears lethargic, withdrawn, does not follow commands. As per daughters, pt is nonverbal but at times may say something. Pt accompanied by 2 daughters Carley and Liz who are also pt's HCPs. Pt's home aide also at bedside. Daughters state pt has been becoming weaker and weaker. Unable to ambulate x last 3 weeks. Prior to that pt was shuffling slowly with assistance only. Pt does not appear in any distress and does not show any signs of discomfort. Skin warm dry and intact.

## 2018-06-04 NOTE — H&P ADULT - PROBLEM SELECTOR PLAN 1
Intermittent, watery, no recent antibiotic use. Likely viral in origin. Patient with mild electrolyte deficiencies and appears hypovolemic 2/2 diarrhea and decreased PO intake. s/p 1 liter IVF in the ED. Hold abx for now as patient family (HCPs) deciding on course of action with comfort care.

## 2018-06-04 NOTE — ED ADULT TRIAGE NOTE - CHIEF COMPLAINT QUOTE
pt BIBA from home, pt c/o generalized weakness and poor PO intake x a few days.  pt also c/o diarrhea.  arrives with #20 gauge in left forearm

## 2018-06-04 NOTE — H&P ADULT - HISTORY OF PRESENT ILLNESS
85F hx of Advanced Alzheimer's Dementia (non-verbal), T2DM presents to [facility] ED after being observed by caretaker and family member to have 2 days of worsening somnolence and new onset diarrhea. At baseline, patient is nonverbal but awake, however now patient mostly sleeping in the context of diarrhea. No recorded fevers at home.     In the ED given 1 liter NS bolus. 85F hx of Advanced Alzheimer's Dementia (non-verbal), T2DM presents to [facility] ED after being observed by caretaker and family member to have 2 days of worsening somnolence and new onset diarrhea. She also has not been eating much (normally pureed food) and has been rejecting food or not swallowing recently. At baseline, patient is nonverbal but awake with indiscernible speech, however now patient mostly sleeping in the context of diarrhea. As per family, the patient has been progressively declining over the years, and had 1 prior episode similar to this where she was not eating, had diarrhea, and was somnolent but she had "perked up" after IVF. No recorded fevers at home.     In the ED given 1 liter NS bolus. Palliative care consulted by ER as family is interested in comfort care with possible referral to inpatient hospice.

## 2018-06-04 NOTE — CONSULT NOTE ADULT - ASSESSMENT
85 year old non-verbal, non-communicative, bedbound, woman from home with 24 hours HHA admitted due to acute diarrhea and advanced Alzheimer's dementia causing her to have acute on chronic mental status deterioration and inability to eat.

## 2018-06-04 NOTE — CONSULT NOTE ADULT - PROBLEM SELECTOR RECOMMENDATION 5
Had a lengthy discussion with both daughters Liz  and Ann both at bedside about Hospice and what it can offer. How it would be helpful to control the symptoms and transition comfortably. 30 minutes at bedside with daughters.  Patient unable to participate.  Both daughters Liz  and Ann both at bedside about Hospice and what it can offer. How it would be helpful to control the symptoms and transition comfortably.  Confirmed advanced directives, patient DNR.  Comfort care. Trying to decide regarding stopping fluids and initiating opiates for generalized pain.

## 2018-06-04 NOTE — H&P ADULT - NSHPPHYSICALEXAM_GEN_ALL_CORE
Vital Signs Last 24 Hrs  T(C): 37.2 (04 Jun 2018 12:05), Max: 37.2 (04 Jun 2018 12:05)  T(F): 99 (04 Jun 2018 12:05), Max: 99 (04 Jun 2018 12:05)  HR: 88 (04 Jun 2018 14:28) (88 - 93)  BP: 148/58 (04 Jun 2018 14:28) (136/82 - 148/58)  BP(mean): --  RR: 16 (04 Jun 2018 14:28) (16 - 18)  SpO2: 100% (04 Jun 2018 14:28) (96% - 100%) Vital Signs Last 24 Hrs  T(C): 37.2 (04 Jun 2018 12:05), Max: 37.2 (04 Jun 2018 12:05)  T(F): 99 (04 Jun 2018 12:05), Max: 99 (04 Jun 2018 12:05)  HR: 88 (04 Jun 2018 14:28) (88 - 93)  BP: 148/58 (04 Jun 2018 14:28) (136/82 - 148/58)  BP(mean): --  RR: 16 (04 Jun 2018 14:28) (16 - 18)  SpO2: 100% (04 Jun 2018 14:28) (96% - 100%)    General: elderly, frail appearing, indiscernible vocalizations    HEENT: EOMI, PERRLA, no conjunctival pallor, MMM, no JVD, no thyromegaly, neck supple, trachea midline  CV: S1S2 RRR no MRG  Lungs: CTA BL  Abdomen: soft ND +BS, (+) diffuse tenderness    Extremities: No CCE +WWP  Skin/MSK: No rashes, preserved ROM on active & passive movement  Neuro: awake, alert, unable to assess orientation 2/2 dementia

## 2018-06-04 NOTE — H&P ADULT - PROBLEM SELECTOR PLAN 2
Hx of Alzheimer's disease, now with changes in mentation (somnolent or agitated) normally non verbal and awake without agitation. Possibly due to diarrhea/failure to thrive. Palliative care discussed with patient family, possible comfort care, will await family discussion regarding course of direction for patient given her inability to make own decisions given advanced dementia

## 2018-06-04 NOTE — H&P ADULT - ASSESSMENT
85F hx of Advanced Alzheimer's Dementia (non-verbal), T2DM being admitted for diarrhea and encephalopathy. Possible viral gastroenteritis, however given progressive dementia and advanced directives may admit for medical optimization and possible referral to hospice, Inpatient v. outpatient will depend on clinical status moving forward.

## 2018-06-04 NOTE — CONSULT NOTE ADULT - PROBLEM SELECTOR RECOMMENDATION 9
She has a FAST score of 7d with PPSv2 as 20%, falling asleep, crying and moaning, not able to swallow liquids and checking the food.  Daughters wants to stop the IV fluids and do not want any measures to prolong the sufferings

## 2018-06-04 NOTE — H&P ADULT - NSHPLABSRESULTS_GEN_ALL_CORE
CBC Full  -  ( 2018 11:44 )  WBC Count : 10.36 K/uL  Hemoglobin : 10.9 g/dL  Hematocrit : 33.2 %  Platelet Count - Automated : 408 K/uL  Mean Cell Volume : 83.8 fL  Mean Cell Hemoglobin : 27.5 pg  Mean Cell Hemoglobin Concentration : 32.8 %  Auto Neutrophil # : 8.59 K/uL  Auto Lymphocyte # : 0.82 K/uL  Auto Monocyte # : 0.77 K/uL  Auto Eosinophil # : 0.09 K/uL  Auto Basophil # : 0.03 K/uL  Auto Neutrophil % : 82.9 %  Auto Lymphocyte % : 7.9 %  Auto Monocyte % : 7.4 %  Auto Eosinophil % : 0.9 %  Auto Basophil % : 0.3 %        133<L>  |  93<L>  |  16  ----------------------------<  172<H>  4.3   |  25  |  0.58    Ca    11.0<H>      2018 11:44    TPro  6.7  /  Alb  3.2<L>  /  TBili  0.3  /  DBili  x   /  AST  37<H>  /  ALT  28  /  AlkPhos  46  -    Blood Gas Venous Comprehensive (18 @ 11:44)    Blood Gas Venous - Lactate: 2.8: Please note updated reference range. mmol/L    Blood Gas Venous - Chloride: 100 mmol/L    Blood Gas Venous - Creatinine: 0.49 mg/dL    pH, Venous: 7.39 pH    pCO2, Venous: 46 mmHg    pO2, Venous: 30 mmHg    HCO3, Venous: 26 mmol/L    Base Excess, Venous: 2.7: REFERENCE RANGE = -3 + 2 mmol/L mmol/L    Oxygen Saturation, Venous: 49.9 %    Blood Gas Venous - Sodium: 129 mmol/L    Blood Gas Venous - Potassium: 4.0 mmol/L    Blood Gas Venous - Glucose: 168    Blood Gas Venous - Hemoglobin: 11.6 g/dL    Blood Gas Venous - Hematocrit: 35.6 %    Urinalysis Basic - ( 2018 13:26 )    Color: YELLOW / Appearance: CLEAR / S.017 / pH: 6.0  Gluc: NEGATIVE / Ketone: NEGATIVE  / Bili: NEGATIVE / Urobili: NORMAL mg/dL   Blood: NEGATIVE / Protein: 10 mg/dL / Nitrite: NEGATIVE   Leuk Esterase: NEGATIVE / RBC: 0-2 / WBC 0-2   Sq Epi: x / Non Sq Epi: x / Bacteria: x

## 2018-06-04 NOTE — H&P ADULT - NSHPREVIEWOFSYSTEMS_GEN_ALL_CORE
REVIEW OF SYSTEMS:    CONSTITUTIONAL: No weakness, fevers or chills  EYES/ENT: No visual changes;  No vertigo or throat pain   NECK: No pain or stiffness  RESPIRATORY: No cough, wheezing, hemoptysis; No shortness of breath  CARDIOVASCULAR: No chest pain or palpitations  GASTROINTESTINAL: No abdominal or epigastric pain. No nausea, vomiting, or hematemesis; No diarrhea or constipation. No melena or hematochezia.  GENITOURINARY: No dysuria, frequency or hematuria  NEUROLOGICAL: No numbness or weakness  SKIN: No itching, burning, rashes, or lesions   All other review of systems is negative unless indicated above. Unable to obtain 2/2 dementia, reported history of decreased po intake, somnolence, and 2 days of diarrhea by daughters and care taker

## 2018-06-04 NOTE — ED PROVIDER NOTE - ATTENDING CONTRIBUTION TO CARE
Attending note:   After face to face evaluation of this patient, I concur with above noted hx, pe, and care plan for this patient. 84 y/o F with advanced demntia, htn, increasingly poor po intake brought by family for end of life care.   No complaints except poor po intake.    evaluation in progress Attending note:   After face to face evaluation of this patient, I concur with above noted hx, pe, and care plan for this patient. 86 y/o F with advanced dementia, htn, increasingly poor po intake brought by family for end of life care.   No complaints except poor po intake.    evaluation in progress

## 2018-06-04 NOTE — H&P ADULT - PROBLEM SELECTOR PLAN 3
Due to decreased PO intake and diarrhea. s/p 1 liter IVF. Will encourage feeding at request of HCP (diet pureed). For now, other interventions such as IVF to be determined by family

## 2018-06-04 NOTE — ED PROVIDER NOTE - MEDICAL DECISION MAKING DETAILS
85F presents with end-stage Alzheimer's signs and symptpoms. Labs significant for hypercalcemia and dehydration. Will provide NS IVF, infectious w/u and admit. Have already discussed advanced directives with family. patient is DNR/DNI. HCP has been established. Family in amenable to hospice dispo

## 2018-06-05 ENCOUNTER — TRANSCRIPTION ENCOUNTER (OUTPATIENT)
Age: 83
End: 2018-06-05

## 2018-06-05 VITALS
SYSTOLIC BLOOD PRESSURE: 144 MMHG | OXYGEN SATURATION: 95 % | TEMPERATURE: 98 F | HEART RATE: 93 BPM | RESPIRATION RATE: 18 BRPM | DIASTOLIC BLOOD PRESSURE: 73 MMHG

## 2018-06-05 DIAGNOSIS — Z51.5 ENCOUNTER FOR PALLIATIVE CARE: ICD-10-CM

## 2018-06-05 DIAGNOSIS — R52 PAIN, UNSPECIFIED: ICD-10-CM

## 2018-06-05 LAB
BACTERIA UR CULT: SIGNIFICANT CHANGE UP
SPECIMEN SOURCE: SIGNIFICANT CHANGE UP

## 2018-06-05 PROCEDURE — 99233 SBSQ HOSP IP/OBS HIGH 50: CPT

## 2018-06-05 RX ORDER — MORPHINE SULFATE 50 MG/1
0.5 CAPSULE, EXTENDED RELEASE ORAL EVERY 6 HOURS
Refills: 0 | Status: DISCONTINUED | OUTPATIENT
Start: 2018-06-05 | End: 2018-06-05

## 2018-06-05 RX ORDER — MORPHINE SULFATE 50 MG/1
0.5 CAPSULE, EXTENDED RELEASE ORAL
Qty: 0 | Refills: 0 | DISCHARGE
Start: 2018-06-05

## 2018-06-05 NOTE — PROGRESS NOTE ADULT - PROBLEM SELECTOR PLAN 5
Hospice referral has been made, patient has 24 hrs of  HHA and family to transition comfortably to hospice.  Confirmed advanced directives, patient remained DNR.   Will sign off. Patient is DNR.  Comfort care.  Awaiting inpatient hospice.

## 2018-06-05 NOTE — PROGRESS NOTE ADULT - SUBJECTIVE AND OBJECTIVE BOX
Patient is a 85y old  Female who presents with a chief complaint of Encephalopathy and Decreased PO intake (2018 15:01)      INTERVAL HPI/OVERNIGHT EVENTS:  T(C): 36.5 (18 @ 04:41), Max: 37.2 (18 @ 12:05)  HR: 93 (18 @ 04:41) (78 - 93)  BP: 162/68 (18 @ 04:41) (147/59 - 166/70)  RR: 18 (18 @ 04:41) (16 - 18)  SpO2: 94% (18 @ 04:41) (94% - 100%)  Wt(kg): --  I&O's Summary      LABS:                        10.9   10.36 )-----------( 408      ( 2018 11:44 )             33.2     06-04    133<L>  |  93<L>  |  16  ----------------------------<  172<H>  4.3   |  25  |  0.58    Ca    11.0<H>      2018 11:44    TPro  6.7  /  Alb  3.2<L>  /  TBili  0.3  /  DBili  x   /  AST  37<H>  /  ALT  28  /  AlkPhos  46  06-04      Urinalysis Basic - ( 2018 13:26 )    Color: YELLOW / Appearance: CLEAR / S.017 / pH: 6.0  Gluc: NEGATIVE / Ketone: NEGATIVE  / Bili: NEGATIVE / Urobili: NORMAL mg/dL   Blood: NEGATIVE / Protein: 10 mg/dL / Nitrite: NEGATIVE   Leuk Esterase: NEGATIVE / RBC: 0-2 / WBC 0-2   Sq Epi: x / Non Sq Epi: x / Bacteria: x      CAPILLARY BLOOD GLUCOSE      POCT Blood Glucose.: 115 mg/dL (2018 18:13)        Urinalysis Basic - ( 2018 13:26 )    Color: YELLOW / Appearance: CLEAR / S.017 / pH: 6.0  Gluc: NEGATIVE / Ketone: NEGATIVE  / Bili: NEGATIVE / Urobili: NORMAL mg/dL   Blood: NEGATIVE / Protein: 10 mg/dL / Nitrite: NEGATIVE   Leuk Esterase: NEGATIVE / RBC: 0-2 / WBC 0-2   Sq Epi: x / Non Sq Epi: x / Bacteria: x        MEDICATIONS  (STANDING):    MEDICATIONS  (PRN):          PHYSICAL EXAM:  GENERAL: NAD, well-groomed, well-developed  HEAD:  Atraumatic, Normocephalic  CHEST/LUNG: Clear to percussion bilaterally; No rales, rhonchi, wheezing, or rubs  HEART: Regular rate and rhythm; No murmurs, rubs, or gallops  ABDOMEN: Soft, Nontender, Nondistended; Bowel sounds present  EXTREMITIES:  2+ Peripheral Pulses, No clubbing, cyanosis, or edema  LYMPH: No lymphadenopathy noted  SKIN: No rashes or lesions    Care Discussed with Consultants/Other Providers [ +] YES  [ ] NO

## 2018-06-05 NOTE — PROGRESS NOTE ADULT - SUBJECTIVE AND OBJECTIVE BOX
INTERVAL HPI / OVERNIGHT EVENTS: Seems to be comfortable, failed bedside speech and swallow and     Allergies    sulfa drugs (Unknown)    Intolerances        ADVANCE DIRECTIVES:    DNR: [ ] NO [ ] YES (Date) MOLST [ ] NO [ ] YES (Date)    MEDICATIONS  (STANDING):  morphine  - Injectable 0.5 milliGRAM(s) IV Push every 6 hours    MEDICATIONS  (PRN):      PRESENT SYMPTOMS:  Source: [ ] Patient   [ ] Family   [ ] Team     Pain:                        [ ] No [ ] Yes             [ ] Mild [ ] Moderate [ ] Severe    Onset -  Location -  Duration -  Character -  Alleviating/Aggravating -  Radiation -  Timing -    Dyspnea:                [ ] No [ ] Yes             [ ] Mild [ ] Moderate [ ] Severe    Anxiety:                  [ ] No [ ] Yes             [ ] Mild [ ] Moderate [ ] Severe    Fatigue:                  [ ] No [ ] Yes             [ ] Mild [ ] Moderate [ ] Severe    Nausea:                  [ ] No [ ] Yes             [ ] Mild [ ] Moderate [ ] Severe    Loss of appetite:   [ ] No [ ] Yes             [ ] Mild [ ] Moderate [ ] Severe    Constipation:        [ ] No [ ] Yes             [ ] Mild [ ] Moderate [ ] Severe    Other Symptoms:  [ ] All other review of systems negative   [ ] Unable to obtain due to poor mentation     Karnofsky Performance Score/Palliative Performance Status Version 2:         %    PHYSICAL EXAM:  Vital Signs Last 24 Hrs  T(C): 36.5 (2018 04:41), Max: 36.5 (2018 17:51)  T(F): 97.7 (2018 04:41), Max: 97.7 (2018 17:51)  HR: 93 (2018 04:41) (78 - 93)  BP: 162/68 (2018 04:41) (148/58 - 166/70)  BP(mean): --  RR: 18 (2018 04:41) (16 - 18)  SpO2: 94% (2018 04:41) (94% - 100%) I&O's Summary      General:  [ ] Alert  [ ] Oriented x      [ ] Lethargic  [ ] Agitated   [ ] Cachexia   [ ] Unarousable  [ ] Verbal  [ ] Non-Verbal    HEENT:  [ ] Normal   [ ] Dry mouth   [ ] ET Tube    [ ] Trach  [ ] Oral lesions    Lungs:   [ ] Clear [ ] Tachypnea  [ ] Audible excessive secretions   [ ] Rhonchi        [ ] Right [ ] Left [ ] Bilateral  [ ] Crackles        [ ] Right [ ] Left [ ] Bilateral  [ ] Wheezing     [ ] Right [ ] Left [ ] Bilateral    Cardiovascular:  [ ] Regular [ ] Irregular [ ] Tachycardia   [ ] Bradycardia  [ ] Murmur [ ] Other    Abdomen: [ ] Soft  [ ] Distended   [ ] +BS  [ ] Non tender [ ] Tender  [ ]PEG   [ ]OGT/ NGT   Last BM:       Genitourinary: [ ] Normal [ ] Incontinent   [ ] Oliguria/Anuria   [ ] Lincoln    Musculoskeletal:  [ ] Normal   [ ] Weakness  [ ] Bedbound/Wheelchair bound [ ] Edema    Neurological: [ ] No focal deficits  [ ] Cognitive impairment  [ ] Dysphagia [ ] Dysarthria [ ] Paresis [ ] Other     Skin: [ ] Normal   [ ] Pressure ulcer(s)                  [ ] Rash    LABS:                        10.9   10.36 )-----------( 408      ( 2018 11:44 )             33.2     06-04    133<L>  |  93<L>  |  16  ----------------------------<  172<H>  4.3   |  25  |  0.58    Ca    11.0<H>      2018 11:44    TPro  6.7  /  Alb  3.2<L>  /  TBili  0.3  /  DBili  x   /  AST  37<H>  /  ALT  28  /  AlkPhos  46  06-04      Urinalysis Basic - ( 2018 13:26 )    Color: YELLOW / Appearance: CLEAR / S.017 / pH: 6.0  Gluc: NEGATIVE / Ketone: NEGATIVE  / Bili: NEGATIVE / Urobili: NORMAL mg/dL   Blood: NEGATIVE / Protein: 10 mg/dL / Nitrite: NEGATIVE   Leuk Esterase: NEGATIVE / RBC: 0-2 / WBC 0-2   Sq Epi: x / Non Sq Epi: x / Bacteria: x        Shock: [ ] Septic [ ] Cardiogenic [ ] Neurologic [ ] Hypovolemic  Vasopressors x   Inotrophs x     Oral Intake: [ ] Unable/mouth care only [ ] Minimal [ ] Moderate [ ] Full Capability    Diet: [ ] NPO [ ] Tube feeds [ ] TPN [ ] Other     RADIOLOGY & ADDITIONAL STUDIES:    REFERRALS:   [ ] Chaplaincy  [ ] Hospice  [ ] Child Life  [ ] Social Work  [ ] Case management [ ] Holistic Therapy INTERVAL HPI / OVERNIGHT EVENTS: Seems to be comfortable, failed bedside speech and swallow. Continues to be lethargic and sleepy.    Allergies    sulfa drugs (Unknown)    Intolerances        ADVANCE DIRECTIVES:    DNR: [ X] NO [ ] YES (Date) MOLST [ ] NO [ ] YES (Date)    MEDICATIONS  (STANDING):  morphine  - Injectable 0.5 milliGRAM(s) IV Push every 6 hours    MEDICATIONS  (PRN):      PRESENT SYMPTOMS:  Source: [ ] Patient   [X ] Family   [X ] Team     Pain:                        [X ] No [ ] Yes             [ ] Mild [ ] Moderate [ ] Severe    Onset -  Location -  Duration -  Character -  Alleviating/Aggravating -  Radiation -  Timing -    Dyspnea:                [ ] No [ ] Yes             [ ] Mild [ ] Moderate [ ] Severe    Anxiety:                  [ ] No [ ] Yes             [ ] Mild [ ] Moderate [ ] Severe    Fatigue:                  [ ] No [ ] Yes             [ ] Mild [ ] Moderate [ ] Severe    Nausea:                  [ ] No [ ] Yes             [ ] Mild [ ] Moderate [ ] Severe    Loss of appetite:   [ ] No [ ] Yes             [ ] Mild [ ] Moderate [ ] Severe    Constipation:        [ ] No [ ] Yes             [ ] Mild [ ] Moderate [ ] Severe    Other Symptoms:  [ ] All other review of systems negative   [X ] Unable to obtain due to poor mentation     Karnofsky Performance Score/Palliative Performance Status Version 2:    10     %    PHYSICAL EXAM:  Vital Signs Last 24 Hrs  T(C): 36.5 (2018 04:41), Max: 36.5 (2018 17:51)  T(F): 97.7 (2018 04:41), Max: 97.7 (2018 17:51)  HR: 93 (2018 04:41) (78 - 93)  BP: 162/68 (2018 04:41) (148/58 - 166/70)  BP(mean): --  RR: 18 (2018 04:41) (16 - 18)  SpO2: 94% (2018 04:41) (94% - 100%) I&O's Summary      General:  [ ] Alert  [ ] Oriented x      [X ] Lethargic  [ ] Agitated   [ ] Cachexia   [ ] Unarousable  [ ] Verbal  [ ] Non-Verbal    HEENT:  [ ] Normal   [X ] Dry mouth   [ ] ET Tube    [ ] Trach  [ ] Oral lesions    Lungs:   [X ] Clear [ ] Tachypnea  [ ] Audible excessive secretions   [ ] Rhonchi        [ ] Right [ ] Left [ ] Bilateral  [ ] Crackles        [ ] Right [ ] Left [ ] Bilateral  [ ] Wheezing     [ ] Right [ ] Left [ ] Bilateral    Cardiovascular:  [X ] Regular [ ] Irregular [ ] Tachycardia   [ ] Bradycardia  [ ] Murmur [ ] Other    Abdomen: [ X] Soft  [ ] Distended   [X ] +BS  [ ] Non tender [ ] Tender  [ ]PEG   [ ]OGT/ NGT   Last BM:       Genitourinary: [ ] Normal [ X] Incontinent   [ ] Oliguria/Anuria   [ ] Lincoln    Musculoskeletal:  [ ] Normal   [ ] Weakness  [X ] Bedbound/Wheelchair bound [ ] Edema    Neurological: [ ] No focal deficits  [ X] Cognitive impairment  [X ] Dysphagia [X ] Dysarthria [X ] Paresis [ ] Other     Skin: [ ] Normal   [ ] Pressure ulcer(s)                  [ ] Rash    LABS:                        10.9   10.36 )-----------( 408      ( 2018 11:44 )             33.2     06-04    133<L>  |  93<L>  |  16  ----------------------------<  172<H>  4.3   |  25  |  0.58    Ca    11.0<H>      2018 11:44    TPro  6.7  /  Alb  3.2<L>  /  TBili  0.3  /  DBili  x   /  AST  37<H>  /  ALT  28  /  AlkPhos  46  06-04      Urinalysis Basic - ( 2018 13:26 )    Color: YELLOW / Appearance: CLEAR / S.017 / pH: 6.0  Gluc: NEGATIVE / Ketone: NEGATIVE  / Bili: NEGATIVE / Urobili: NORMAL mg/dL   Blood: NEGATIVE / Protein: 10 mg/dL / Nitrite: NEGATIVE   Leuk Esterase: NEGATIVE / RBC: 0-2 / WBC 0-2   Sq Epi: x / Non Sq Epi: x / Bacteria: x        Shock: [ ] Septic [ ] Cardiogenic [ ] Neurologic [ ] Hypovolemic  Vasopressors x   Inotrophs x     Oral Intake: [ ] Unable/mouth care only [ ] Minimal [ ] Moderate [ ] Full Capability    Diet: [ ] NPO [ ] Tube feeds [ ] TPN [ ] Other     RADIOLOGY & ADDITIONAL STUDIES:    REFERRALS:   [ ] Chaplaincy  [X ] Hospice  [ ] Child Life  [ ] Social Work  [ ] Case management [ ] Holistic Therapy

## 2018-06-05 NOTE — DISCHARGE NOTE ADULT - CARE PLAN
Principal Discharge DX:	Failure to thrive in adult  Goal:	Optimal oral intake  Assessment and plan of treatment:	- normally non verbal and awake without agitation, from home with 24 hours HHA   - Palliative care consulted and discussed with patients family who wants comfort care and inpatient Hospice services   - Decision was made to stop the IV fluids and daughters do not want any measures to prolong the sufferings, no feeding tube. Comfort care with IV Morphine 0.5 mg IV q 6hrs standing  - Dysphasia 1 Pureed diet, No liquids  Secondary Diagnosis:	Alzheimer's disease of other onset without behavioral disturbance  Assessment and plan of treatment:	- normally non verbal and awake without agitation, from home with 24 hours HHA   - Palliative care consulted and discussed with patients family who wants comfort care and inpatient Hospice services   - Decision was made to stop the IV fluids and daughters do not want any measures to prolong the sufferings, no feeding tube. Comfort care with IV Morphine 0.5 mg IV q 6hrs standing  - Dysphasia 1 Pureed diet, No liquids  Secondary Diagnosis:	Advance care planning  Assessment and plan of treatment:	- normally non verbal and awake without agitation, from home with 24 hours HHA   - Palliative care consulted and discussed with patients family who wants comfort care and inpatient Hospice services   - Decision was made to stop the IV fluids and daughters do not want any measures to prolong the sufferings, no feeding tube. Comfort care with IV Morphine 0.5 mg IV q 6hrs standing  - Dysphasia 1 Pureed diet, No liquids

## 2018-06-05 NOTE — DISCHARGE NOTE ADULT - MEDICATION SUMMARY - MEDICATIONS TO TAKE
I will START or STAY ON the medications listed below when I get home from the hospital:    morphine  -- 0.5 milligram(s) intravenous every 6 hours for comfort measures  -- Indication: For comfort measure    losartan 25 mg oral tablet  -- 1 tab(s) by mouth once a day  -- Indication: For Essential hypertension

## 2018-06-05 NOTE — CONSULT NOTE ADULT - SUBJECTIVE AND OBJECTIVE BOX
[facility] [place]([facility]), [facility]        Patient is a 85y old  Female who presents with a chief complaint of Encephalopathy and Decreased PO intake (2018 12:37)      HPI:  85F hx of Advanced Alzheimer's Dementia (non-verbal), T2DM presents to [facility] ED after being observed by caretaker and family member to have 2 days of worsening somnolence and new onset diarrhea. She also has not been eating much (normally pureed food) and has been rejecting food or not swallowing recently. At baseline, patient is nonverbal but awake with indiscernible speech, however now patient mostly sleeping in the context of diarrhea. As per family, the patient has been progressively declining over the years, and had 1 prior episode similar to this where she was not eating, had diarrhea, and was somnolent but she had "perked up" after IVF. No recorded fevers at home.     In the ED given 1 liter NS bolus. Palliative care consulted by ER as family is interested in comfort care with possible referral to inpatient hospice.             *****PAST MEDICAL / Surgical  HISTORY:  PAST MEDICAL & SURGICAL HISTORY:  Alzheimer's disease of other onset without behavioral disturbance  Type 2 diabetes mellitus with complication, without long-term current use of insulin  HTN (hypertension)  DM (diabetes mellitus)  No significant past surgical history           *****FAMILY HISTORY:  FAMILY HISTORY:  No pertinent family history in first degree relatives           *****SOCIAL HISTORY:  Alcohol: None  Smoking: None         *****ALLERGIES:   Allergies    sulfa drugs (Unknown)    Intolerances             *****MEDICATIONS: current medication reviewed and documented.   MEDICATIONS  (STANDING):  morphine  - Injectable 0.5 milliGRAM(s) IV Push every 6 hours    MEDICATIONS  (PRN):           *****REVIEW OF SYSTEM:  GEN: no fever, no chills, no pain  RESP: no SOB, no cough, no sputum  CVS: no chest pain, no palpitations, no edema  GI: no abdominal pain, no nausea, no vomiting, no constipation, no diarrhea  : no dysurea, no frequency, no hematurea  Neuro: no headache, no dizziness  PSYCH: no anxiety, no depression  Derm : no itching, no rash         *****VITAL SIGNS:  T(C): 36.5 (18 @ 04:41), Max: 36.5 (18 @ 17:51)  HR: 93 (18 @ 04:41) (78 - 93)  BP: 162/68 (18 @ 04:41) (148/58 - 166/70)  RR: 18 (18 @ 04:41) (16 - 18)  SpO2: 94% (18 @ 04:41) (94% - 100%)  Wt(kg): --           *****PHYSICAL EXAM:   Alert oriented x 3   Attention comprehension are fair. Able to name, repeat, read without any difficulty.   Able to follow 3 step commands.     EOMI fundi not visualized,  VFF to confrontration  No facial asymmetry   Tongue is midline   Palate elevates symmetrically   Moving all 4 ext symmetrically no pronator drift.  Reflexes are symmetric throughout   sensation is grossly symmetric  Gait : not assessed.  B/L down going toes        >>> <<<         *****LAB AND IMAGING:                          10.9   10.36 )-----------( 408      ( 2018 11:44 )             33.2               06-04    133<L>  |  93<L>  |  16  ----------------------------<  172<H>  4.3   |  25  |  0.58    Ca    11.0<H>      2018 11:44    TPro  6.7  /  Alb  3.2<L>  /  TBili  0.3  /  DBili  x   /  AST  37<H>  /  ALT  28  /  AlkPhos  46  06-04                            Urinalysis Basic - ( 2018 13:26 )    Color: YELLOW / Appearance: CLEAR / S.017 / pH: 6.0  Gluc: NEGATIVE / Ketone: NEGATIVE  / Bili: NEGATIVE / Urobili: NORMAL mg/dL   Blood: NEGATIVE / Protein: 10 mg/dL / Nitrite: NEGATIVE   Leuk Esterase: NEGATIVE / RBC: 0-2 / WBC 0-2   Sq Epi: x / Non Sq Epi: x / Bacteria: x        [All pertinent recent Imaging reports reviewed]         *****A S S E S S M E N T   A N D   P L A N :    85F hx of Advanced Alzheimer's Dementia (non-verbal), T2DM presents to [facility] ED after being observed by caretaker and family member to have 2 days of worsening somnolence and new onset diarrhea. She also has not been eating much (normally pureed food) and has been rejecting food or not swallowing recently. At baseline, patient is nonverbal but awake with indiscernible speech, however now patient mostly sleeping in the context of diarrhea. As per family, the patient has been progressively declining over the years, and had 1 prior episode similar to this where she was not eating, had diarrhea, and was somnolent but she had "perked up" after IVF. No recorded fevers at home.     In the ED given 1 liter NS bolus. Palliative care consulted by ER as family is interested in comfort care with possible referral to inpatient hospice  toxic metabolic encephalopathy likely due to diarrhea, worsening underlying mental status.  pts family is requesting hospice due to advanced age, and poor baseline status, understandable.  Hospice eval done, pt moving to [facility] for inpt hospice.    on morphine iv pushes for now.   provided support to pts family at bedside.           60 minutes spent on the total encounter;  more than 50 % of the visit was spent on counseling  and or coordinating care by the attending physician.    Thank you for allowing me to participate in the care of this patrick patient. Please do not hesitate to call me if you have any questions.   ___________________________  Will follow with you.  Thank you,  Ronda Hernández MD  Diplomate of the American Board of Neurology and Psychiatry.  Diplomate of the American Board of Vascular Neurology.   [facility] ([facility]), [facility]   Ph: [phone]      This and subsequent notes were partially created using voice recognition software and will  inherently be subject to errors including those of syntax and sound alike substitutions which may escape proofreading. In such instances original meaning may be extrapolated by contextual derivation. [facility]        Patient is a 85y old  Female who presents with a chief complaint of Encephalopathy and Decreased PO intake (2018 12:37)      HPI:  85F hx of Advanced Alzheimer's Dementia (non-verbal), T2DM presents to [facility] ED after being observed by caretaker and family member to have 2 days of worsening somnolence and new onset diarrhea. She also has not been eating much (normally pureed food) and has been rejecting food or not swallowing recently. At baseline, patient is nonverbal but awake with indiscernible speech, however now patient mostly sleeping in the context of diarrhea. As per family, the patient has been progressively declining over the years, and had 1 prior episode similar to this where she was not eating, had diarrhea, and was somnolent but she had "perked up" after IVF. No recorded fevers at home.    pts 2 daughters are in the room.          *****PAST MEDICAL / Surgical  HISTORY:  PAST MEDICAL & SURGICAL HISTORY:  Alzheimer's disease of other onset without behavioral disturbance  Type 2 diabetes mellitus with complication, without long-term current use of insulin  HTN (hypertension)  DM (diabetes mellitus)  No significant past surgical history           *****FAMILY HISTORY:  FAMILY HISTORY:  No pertinent family history in first degree relatives           *****SOCIAL HISTORY:  Alcohol: None  Smoking: None         *****ALLERGIES:   Allergies    sulfa drugs (Unknown)    Intolerances             *****MEDICATIONS: current medication reviewed and documented.   MEDICATIONS  (STANDING):  morphine  - Injectable 0.5 milliGRAM(s) IV Push every 6 hours    MEDICATIONS  (PRN):           *****REVIEW OF SYSTEM:  GEN: no fever, no chills, no pain  RESP: no SOB, no cough, no sputum  CVS: no chest pain, no palpitations, no edema  GI: no abdominal pain, no nausea, no vomiting, no constipation, no diarrhea  : no dysurea, no frequency, no hematurea  Neuro: no headache, no dizziness  PSYCH: no anxiety, no depression  Derm : no itching, no rash         *****VITAL SIGNS:  T(C): 36.5 (18 @ 04:41), Max: 36.5 (18 @ 17:51)  HR: 93 (18 @ 04:41) (78 - 93)  BP: 162/68 (18 @ 04:41) (148/58 - 166/70)  RR: 18 (18 @ 04:41) (16 - 18)  SpO2: 94% (18 @ 04:41) (94% - 100%)  Wt(kg): --           *****PHYSICAL EXAM:   exam deferred as family wants patient to be comfortable.   She appears to be sleeping comfortably today. not in any distress.   >>> <<<         *****LAB AND IMAGING:                          10.9   10.36 )-----------( 408      ( 2018 11:44 )             33.2               06-04    133<L>  |  93<L>  |  16  ----------------------------<  172<H>  4.3   |  25  |  0.58    Ca    11.0<H>      2018 11:44    TPro  6.7  /  Alb  3.2<L>  /  TBili  0.3  /  DBili  x   /  AST  37<H>  /  ALT  28  /  AlkPhos  46  06-04                            Urinalysis Basic - ( 2018 13:26 )    Color: YELLOW / Appearance: CLEAR / S.017 / pH: 6.0  Gluc: NEGATIVE / Ketone: NEGATIVE  / Bili: NEGATIVE / Urobili: NORMAL mg/dL   Blood: NEGATIVE / Protein: 10 mg/dL / Nitrite: NEGATIVE   Leuk Esterase: NEGATIVE / RBC: 0-2 / WBC 0-2   Sq Epi: x / Non Sq Epi: x / Bacteria: x        [All pertinent recent Imaging reports reviewed]         *****A S S E S S M E N T   A N D   P L A N :    85F hx of Advanced Alzheimer's Dementia (non-verbal), T2DM presents to [facility] ED after being observed by caretaker and family member to have 2 days of worsening somnolence and new onset diarrhea. She also has not been eating much (normally pureed food) and has been rejecting food or not swallowing recently. At baseline, patient is nonverbal but awake with indiscernible speech, however now patient mostly sleeping in the context of diarrhea. As per family, the patient has been progressively declining over the years, and had 1 prior episode similar to this where she was not eating, had diarrhea, and was somnolent but she had "perked up" after IVF. No recorded fevers at home.     In the ED given 1 liter NS bolus. Palliative care consulted by ER as family is interested in comfort care with possible referral to inpatient hospice  toxic metabolic encephalopathy likely due to diarrhea, worsening underlying mental status.  pts family is requesting hospice due to advanced age, and poor baseline status, understandable.  Hospice eval done, pt moving to [facility] for inpt hospice.    on morphine iv pushes prn for now.   provided support to pts family at bedside.   offered social work services.           60 minutes spent on the total encounter;  more than 50 % of the visit was spent on counseling  and or coordinating care by the attending physician.    Thank you for allowing me to participate in the care of this patrick patient. Please do not hesitate to call me if you have any questions.   ___________________________  Will follow with you.  Thank you,  Ronda Hernández MD  Diplomate of the American Board of Neurology and Psychiatry.  Diplomate of the American Board of Vascular Neurology.   [facility] ([place]), [facility]   Ph: [phone]      This and subsequent notes were partially created using voice recognition software and will  inherently be subject to errors including those of syntax and sound alike substitutions which may escape proofreading. In such instances original meaning may be extrapolated by contextual derivation.

## 2018-06-05 NOTE — DISCHARGE NOTE ADULT - HOSPITAL COURSE
85F hx of Advanced Alzheimer's Dementia (non-verbal), T2DM being admitted for diarrhea and encephalopathy. Possible viral gastroenteritis, however given progressive dementia and advanced directives may admit for medical optimization and possible referral to hospice, Inpatient v. outpatient will depend on clinical status moving forward.    Hospital Course    Diarrhea- Intermittent, watery, no recent antibiotic use. Likely viral in origin. s/p 1 liter IVF in the ED. Hold abx for now as patient family (HCPs) deciding on course of action with comfort care.     Alzheimer's disease of other onset with behavioral disturbance- Hx of Alzheimer's disease, normally non verbal and awake without agitation, from home with 24 hours HHA.  Palliative care discussed with patient family who wants comfort care. Daughters wants to stop the IV fluids and do not want any measures to prolong the sufferings, no feeding tube    Failure to thrive in adult- Due to decreased PO intake and diarrhea, s/p 1 liter IVF. Comfort care    Essential hypertension- Held anti-hypertensives for now. Check BP q4-6h.     Type 2 diabetes mellitus- Held metformin, will re-assess fingerstick glucose pending family request.     Prophylactic measure- Given discussion for plan of care, would hold HSQ for dvt prophylaxis for now.

## 2018-06-05 NOTE — PROGRESS NOTE ADULT - ASSESSMENT
85F hx of Advanced Alzheimer's Dementia (non-verbal), T2DM being admitted for diarrhea and encephalopathy. Possible viral gastroenteritis, however given progressive dementia and advanced directives may admit for medical optimization and possible referral to hospice, Inpatient v. outpatient will depend on clinical status moving forward.       Problem/Plan - 1:  ·  Problem: Diarrhea.  Plan: Improved  monitor  Problem/Plan - 2:  ·  Problem: Alzheimer's disease of other onset with behavioral disturbance.  Plan: stable    Problem/Plan - 3:  ·  Problem: Failure to thrive in adult.  Plan: pallliative fu  Problem/Plan - 4:  ·  Problem: Essential hypertension.  Plan: Hold anti-hypertensives for now. Check BP q4-6h.     Problem/Plan - 5:  ·  Problem: Type 2 diabetes mellitus.  Plan: monitor FS    Palliatve fu or GOC
85 year old non-verbal, non-communicative, bedbound, woman from home with 24 hours HHA admitted due to acute diarrhea and advanced Alzheimer's dementia causing her to have acute on chronic mental status deterioration and inability to eat.

## 2018-06-05 NOTE — PROGRESS NOTE ADULT - PROBLEM SELECTOR PLAN 1
She has a FAST score of 7d with PPSv2 as 10%, falling asleep, crying and moaning, not able to swallow liquids and cheeking the food.  Daughters wants to stop the IV fluids and do not want any measures to prolong the sufferings.

## 2018-06-05 NOTE — CONSULT NOTE ADULT - CONSULT REASON
End-stage Alzheimer's dementia. patient dnr/dni. patient would like to discuss hospice options.
confusion

## 2018-06-05 NOTE — DISCHARGE NOTE ADULT - MEDICATION SUMMARY - MEDICATIONS TO STOP TAKING
I will STOP taking the medications listed below when I get home from the hospital:    metFORMIN 500 mg oral tablet  -- 1 tab(s) by mouth 2 times a day    pravastatin 40 mg oral tablet  -- 1 tab(s) by mouth once a day    losartan-hydrochlorothiazide 50mg-12.5mg oral tablet  -- 1 tab(s) by mouth once a day    aspirin 81 mg oral delayed release tablet  -- 1 tab(s) by mouth once a day    Paul 128 ophthalmic ointment  -- 1 drop(s) to each affected eye once a day

## 2018-06-05 NOTE — GOALS OF CARE CONVERSATION - PERSONAL ADVANCE DIRECTIVE - CONVERSATION DETAILS
[facility] - Consents signed, pt has been approved for In Hospice at [email or web address]

## 2018-06-05 NOTE — DISCHARGE NOTE ADULT - PATIENT PORTAL LINK FT
You can access the [facility] Patient Portal, offered by [facility], by registering with the following website: [email or web address]

## 2018-06-05 NOTE — DISCHARGE NOTE ADULT - PLAN OF CARE
Optimal oral intake - normally non verbal and awake without agitation, from home with 24 hours HHA   - Palliative care consulted and discussed with patients family who wants comfort care and inpatient Hospice services   - Decision was made to stop the IV fluids and daughters do not want any measures to prolong the sufferings, no feeding tube. Comfort care with IV Morphine 0.5 mg IV q 6hrs standing  - Dysphasia 1 Pureed diet, No liquids

## 2018-06-05 NOTE — PROGRESS NOTE ADULT - PROBLEM SELECTOR PLAN 4
Likely due to liquid diet Vs infectious. Continue symptomatic management. will consider imodium to decrease the frequency. Morphine 0.5mg IV Q6 hours.  Uptitrate as necessary.  Secondary to generalized osteoarthritis.   Patient with oral route.

## 2018-06-09 LAB
BACTERIA BLD CULT: SIGNIFICANT CHANGE UP
BACTERIA BLD CULT: SIGNIFICANT CHANGE UP

## 2019-09-18 NOTE — PATIENT PROFILE ADULT. - FUNCTIONAL SCREEN CURRENT LEVEL: BATHING, MLM
Assessment/Plan


Problem List:  


(1) Diabetes


ICD Codes:  E11.9 - Type 2 diabetes mellitus without complications


SNOMED:  44493065


(2) Malnutrition


ICD Codes:  E46 - Unspecified protein-calorie malnutrition


SNOMED:  69954524


(3) Failure to thrive


SNOMED:  76672434


Qualifiers:  


   Qualified Codes:  R62.7 - Adult failure to thrive


(4) Weakness


ICD Codes:  R53.1 - Weakness


SNOMED:  45658614


(5) UTI (urinary tract infection)


ICD Codes:  N39.0 - Urinary tract infection, site not specified


SNOMED:  00842261


Qualifiers:  


   Qualified Codes:  N39.0 - Urinary tract infection, site not specified


Status:  stable, progressing


Assessment/Plan:


pt diet abx dc to snf if clear





Subjective


Constitutional:  Reports: weakness


Allergies:  


Coded Allergies:  


     LISINOPRIL (Verified  Allergy, Unknown, 9/14/19)


All Systems:  reviewed and negative except above


Subjective


calm in bed





Objective





Last 24 Hour Vital Signs








  Date Time  Temp Pulse Resp B/P (MAP) Pulse Ox O2 Delivery O2 Flow Rate FiO2


 


9/18/19 12:00 97.4 65 18 121/65 (83) 96   


 


9/18/19 09:00      Room Air  


 


9/18/19 08:49    152/72    


 


9/18/19 08:00 97.0 62 20 152/72 (98) 97   


 


9/18/19 04:00 98.2 60 18 109/76 (87) 96   


 


9/18/19 00:09 97.5 61 22 103/55 (71) 96   


 


9/17/19 21:00      Room Air  


 


9/17/19 20:00 97.5 89 22 104/80 (88) 97   


 


9/17/19 16:00 98.0 86 20 123/64 (83) 97   

















Intake and Output  


 


 9/17/19 9/18/19





 19:00 07:00


 


Intake Total 410 ml 555 ml


 


Output Total 450 ml 800 ml


 


Balance -40 ml -245 ml


 


  


 


Intake Oral 360 ml 


 


IV Total 50 ml 555 ml


 


Output Urine Total 450 ml 800 ml


 


# Bowel Movements 1 








Laboratory Tests


9/18/19 05:50: 


White Blood Count 5.9, Red Blood Count 4.81, Hemoglobin 14.7, Hematocrit 44.2, 

Mean Corpuscular Volume 92, Mean Corpuscular Hemoglobin 30.5, Mean Corpuscular 

Hemoglobin Concent 33.2, Red Cell Distribution Width 12.5, Platelet Count 188, 

Mean Platelet Volume 7.4, Neutrophils (%) (Auto) 60.8, Lymphocytes (%) (Auto) 

26.5, Monocytes (%) (Auto) 11.2H, Eosinophils (%) (Auto) 1.1, Basophils (%) (

Auto) 0.5, Sodium Level 140, Potassium Level 3.6, Chloride Level 104, Carbon 

Dioxide Level 29, Anion Gap 7, Blood Urea Nitrogen 9, Creatinine 0.5L, Estimat 

Glomerular Filtration Rate , Glucose Level 78, Calcium Level 9.1, Phosphorus 

Level 2.7, Magnesium Level 1.6L


Height (Feet):  5


Height (Inches):  1.00


Weight (Pounds):  96


General Appearance:  lethargic


EENT:  normal ENT inspection


Neck:  normal alignment


Cardiovascular:  normal peripheral pulses, normal rate, regular rhythm


Respiratory/Chest:  chest wall non-tender, lungs clear, normal breath sounds


Abdomen:  normal bowel sounds, non tender, soft


Extremities:  normal inspection


Edema:  no edema noted Arm (L), no edema noted Arm (R), no edema noted Leg (L), 

no edema noted Leg (R), no edema noted Pedal (L), no edema noted Pedal (R), no 

edema noted Generalized


Neurologic:  motor weakness


Skin:  normal pigmentation, warm/dry











Chino Pineda DO Sep 18, 2019 14:09 (2) assistive person

## 2023-10-16 RX ORDER — ASPIRIN/CALCIUM CARB/MAGNESIUM 324 MG
1 TABLET ORAL
Qty: 0 | Refills: 0 | DISCHARGE

## 2023-10-16 RX ORDER — SODIUM CHLORIDE 5 %
1 DROPS OPHTHALMIC (EYE)
Qty: 0 | Refills: 0 | DISCHARGE

## 2023-10-16 RX ORDER — LOSARTAN POTASSIUM 100 MG/1
1 TABLET, FILM COATED ORAL
Qty: 0 | Refills: 0 | DISCHARGE

## 2023-10-16 RX ORDER — LOSARTAN/HYDROCHLOROTHIAZIDE 100MG-25MG
1 TABLET ORAL
Qty: 0 | Refills: 0 | DISCHARGE

## 2023-10-16 RX ORDER — METFORMIN HYDROCHLORIDE 850 MG/1
1 TABLET ORAL
Qty: 0 | Refills: 0 | DISCHARGE

## 2024-10-22 NOTE — ED ADULT NURSE NOTE - NS ED NURSE DISCH DISPOSITION
Price (Use Numbers Only, No Special Characters Or $): 160 Anesthesia Type: 1% lidocaine with epinephrine Post-Care Instructions: I reviewed with the patient in detail post-care instructions. Patient is to wear sunprotection, and avoid picking at any of the treated lesions. Pt may apply Vaseline to crusted or scabbing areas Detail Level: Zone Consent: The patient's consent was obtained including but not limited to risks of crusting, scabbing, blistering, scarring, darker or lighter pigmentary change, recurrence, incomplete removal and infection. Admitted